# Patient Record
Sex: MALE | Race: OTHER | Employment: FULL TIME | URBAN - METROPOLITAN AREA
[De-identification: names, ages, dates, MRNs, and addresses within clinical notes are randomized per-mention and may not be internally consistent; named-entity substitution may affect disease eponyms.]

---

## 2018-06-13 ENCOUNTER — APPOINTMENT (OUTPATIENT)
Dept: GENERAL RADIOLOGY | Age: 23
End: 2018-06-13
Attending: NURSE PRACTITIONER
Payer: COMMERCIAL

## 2018-06-13 ENCOUNTER — HOSPITAL ENCOUNTER (EMERGENCY)
Age: 23
Discharge: HOME | End: 2018-06-13
Attending: EMERGENCY MEDICINE
Payer: COMMERCIAL

## 2018-06-13 VITALS
WEIGHT: 220 LBS | HEART RATE: 84 BPM | OXYGEN SATURATION: 99 % | BODY MASS INDEX: 28.23 KG/M2 | DIASTOLIC BLOOD PRESSURE: 73 MMHG | SYSTOLIC BLOOD PRESSURE: 145 MMHG | RESPIRATION RATE: 16 BRPM | HEIGHT: 74 IN | TEMPERATURE: 99 F

## 2018-06-13 DIAGNOSIS — R42 DIZZINESS: Primary | ICD-10-CM

## 2018-06-13 DIAGNOSIS — R07.89 CHEST PAIN, NON-CARDIAC: ICD-10-CM

## 2018-06-13 PROCEDURE — 93005 ELECTROCARDIOGRAM TRACING: CPT

## 2018-06-13 PROCEDURE — 80053 COMPREHEN METABOLIC PANEL: CPT | Performed by: NURSE PRACTITIONER

## 2018-06-13 PROCEDURE — 96360 HYDRATION IV INFUSION INIT: CPT

## 2018-06-13 PROCEDURE — 85025 COMPLETE CBC W/AUTO DIFF WBC: CPT | Performed by: NURSE PRACTITIONER

## 2018-06-13 PROCEDURE — 99285 EMERGENCY DEPT VISIT HI MDM: CPT

## 2018-06-13 PROCEDURE — 81003 URINALYSIS AUTO W/O SCOPE: CPT | Performed by: NURSE PRACTITIONER

## 2018-06-13 PROCEDURE — 71046 X-RAY EXAM CHEST 2 VIEWS: CPT | Performed by: NURSE PRACTITIONER

## 2018-06-13 PROCEDURE — 82962 GLUCOSE BLOOD TEST: CPT

## 2018-06-13 PROCEDURE — 84484 ASSAY OF TROPONIN QUANT: CPT | Performed by: EMERGENCY MEDICINE

## 2018-06-13 PROCEDURE — 85378 FIBRIN DEGRADE SEMIQUANT: CPT | Performed by: EMERGENCY MEDICINE

## 2018-06-13 PROCEDURE — 93010 ELECTROCARDIOGRAM REPORT: CPT

## 2018-06-13 NOTE — ED INITIAL ASSESSMENT (HPI)
Pt states since Monday feeling light headed and dizzy , today woke up with chest pain, , denies sob or nausea.  States sat/sun did ecstacy and etoh,states not eating to much due to right jaw pain from grinding teeth on sat

## 2018-06-13 NOTE — ED PROVIDER NOTES
[facility] In [place]    History   Patient presents with:  Dizziness (neurologic)    Stated Complaint: Dizziness    71-year-old male presents today with complaints of feeling of dizziness since Saturday.   Admits to us distress. HENT:   Head: Normocephalic. Nose: Nose normal.   Mouth/Throat: Uvula is midline, oropharynx is clear and moist and mucous membranes are normal.   Neck: Normal range of motion. Neck supple.    Cardiovascular: Normal rate, regular rhythm and no Final result                 Please view results for these tests on the individual orders. RAINBOW DRAW BLUE   RAINBOW DRAW LAVENDER   RAINBOW DRAW LIGHT GREEN     EKG    Rate, intervals and axes as noted on EKG Report.   Rate:90  Rhythm: Sinus Rhythm  Re

## 2021-08-13 ENCOUNTER — HOSPITAL ENCOUNTER (EMERGENCY)
Age: 26
Discharge: HOME | End: 2021-08-14
Attending: EMERGENCY MEDICINE
Payer: COMMERCIAL

## 2021-08-13 VITALS
WEIGHT: 225 LBS | RESPIRATION RATE: 16 BRPM | HEIGHT: 73 IN | SYSTOLIC BLOOD PRESSURE: 134 MMHG | OXYGEN SATURATION: 99 % | TEMPERATURE: 98 F | DIASTOLIC BLOOD PRESSURE: 66 MMHG | BODY MASS INDEX: 29.82 KG/M2 | HEART RATE: 72 BPM

## 2021-08-13 DIAGNOSIS — H61.21 IMPACTED CERUMEN OF RIGHT EAR: Primary | ICD-10-CM

## 2021-08-13 PROCEDURE — 99282 EMERGENCY DEPT VISIT SF MDM: CPT

## 2021-08-13 PROCEDURE — 69209 REMOVE IMPACTED EAR WAX UNI: CPT

## 2021-08-14 NOTE — ED PROVIDER NOTES
Patient Seen in: THE [facility] Emergency Department In [place]      History   Patient presents with:  Ear Problem Pain    Stated Complaint: Bilat ear pain and decreased hearing since Tuesday.     HPI/Subjective:   HPI    26-year-old male presents today for ri Eyes:      Extraocular Movements: Extraocular movements intact. Cardiovascular:      Rate and Rhythm: Normal rate. Pulmonary:      Effort: Pulmonary effort is normal.   Abdominal:      General: Abdomen is flat.    Musculoskeletal:         General: Nor

## 2024-08-19 ENCOUNTER — HOSPITAL ENCOUNTER (EMERGENCY)
Age: 29
Discharge: HOME | End: 2024-08-19
Attending: EMERGENCY MEDICINE
Payer: OTHER MISCELLANEOUS

## 2024-08-19 ENCOUNTER — APPOINTMENT (OUTPATIENT)
Dept: GENERAL RADIOLOGY | Age: 29
End: 2024-08-19
Payer: OTHER MISCELLANEOUS

## 2024-08-19 VITALS
RESPIRATION RATE: 16 BRPM | BODY MASS INDEX: 29 KG/M2 | DIASTOLIC BLOOD PRESSURE: 92 MMHG | TEMPERATURE: 99 F | HEART RATE: 74 BPM | SYSTOLIC BLOOD PRESSURE: 143 MMHG | WEIGHT: 222 LBS | OXYGEN SATURATION: 97 %

## 2024-08-19 DIAGNOSIS — S69.91XA FINGER INJURY, RIGHT, INITIAL ENCOUNTER: Primary | ICD-10-CM

## 2024-08-19 PROCEDURE — 99284 EMERGENCY DEPT VISIT MOD MDM: CPT

## 2024-08-19 PROCEDURE — 99283 EMERGENCY DEPT VISIT LOW MDM: CPT

## 2024-08-19 PROCEDURE — 73140 X-RAY EXAM OF FINGER(S): CPT

## 2024-08-19 RX ORDER — HYDROCODONE BITARTRATE AND ACETAMINOPHEN 5; 325 MG/1; MG/1
1-2 TABLET ORAL EVERY 6 HOURS PRN
Qty: 10 TABLET | Refills: 0 | Status: SHIPPED | OUTPATIENT
Start: 2024-08-19 | End: 2024-08-24

## 2024-08-19 RX ORDER — CEPHALEXIN 500 MG/1
500 CAPSULE ORAL 4 TIMES DAILY
Qty: 40 CAPSULE | Refills: 0 | Status: SHIPPED | OUTPATIENT
Start: 2024-08-19 | End: 2024-08-29

## 2024-08-20 NOTE — ED PROVIDER NOTES
Patient Seen in: [facility] In [place]      History     Chief Complaint   Patient presents with    Arm or Hand Injury     Stated Complaint: Got finger caught at work, whole nail came off. R 3rd digit    Subjective:   HPI    29-year-old male presenting emerged part for finger injury.  Patient was at work when his finger got caught got crushed symptoms the third digit with finger that was caught and crushed.  Fingernail came out at the cuticle prompting his visit here he denies any other complaints.    Objective:   History reviewed. No pertinent past medical history.           Past Surgical History:   Procedure Laterality Date    Other surgical history      cyst removal from heel, 12 years ago                Social History     Socioeconomic History    Marital status: Single   Tobacco Use    Smoking status: Never    Smokeless tobacco: Never    Tobacco comments:     marijuana   Vaping Use    Vaping status: Never Used   Substance and Sexual Activity    Alcohol use: Yes    Drug use: Yes     Types: Cannabis              Review of Systems    Positive for stated Chief Complaint: Arm or Hand Injury    Other systems are as noted in HPI.  Constitutional and vital signs reviewed.      All other systems reviewed and negative except as noted above.    Physical Exam     ED Triage Vitals [08/19/24 2123]   BP (!) 143/92   Pulse 74   Resp 16   Temp 98.7 °F (37.1 °C)   Temp src Temporal   SpO2 97 %   O2 Device None (Room air)       Current Vitals:   Vital Signs  BP: (!) 143/92  Pulse: 74  Resp: 16  Temp: 98.7 °F (37.1 °C)  Temp src: Temporal    Oxygen Therapy  SpO2: 97 %  O2 Device: None (Room air)            Physical Exam  Awake alert patient appears no distress HEENT exam normal lungs normal cardiovascular exam normal abdomen normal right hand third digit nail is removed from the cuticle on the third digit no active bleeding decree sensation of the fingertip no active bleeding no foreign bodies cap refill less  than 2 seconds no focal neurologic deficits       ED Course   Labs Reviewed - No data to display          Differential diagnosis includes fracture, dislocation         MDM                                         Medical Decision Making  29-year-old male with right third finger injury.  Independent interpretation by ED physician of x-ray shows no acute fracture.  Patient occlusion of metacarpal head block 3 cc of 1% lidocaine solution was instilled into the third metacarpal head region good anesthetic effect achieved.  The nail was placed back within the cuticle and a dressing was applied independent interpretation by ED physician x-ray shows no acute fractures patient was referred to hand surgery as well as Ko[facility] and is return emerged part worsening symptoms other complaints I did talk with patient about the potential for nail loss that is permanent.  The patient was screened and evaluated during this visit.  As a treating physician attending to the patient, I determined, within reasonable clinical confidence and prior to discharge, that an emergency medical condition was not or was no longer present.  There was no indication for further evaluation, treatment or admission on an emergency basis.    The usual and customary discharge instructions were discussed given the patient's ER course.  We discussed signs and symptoms that should prompt the patient's immediate return to the emergency department.  Reasonable over-the-counter and prescription treatment options and physician follow-up plan was discussed.  Patient was discharged home in good condition  This note was prepared using Dragon Medical voice recognition dictation software.  As a result errors may occur.  When identified to these areas have been corrected.  While every attempt is made to correct errors during dictation discrepancies may still exist.  Please contact if there are any errors    Problems Addressed:  Finger injury, right, initial  encounter: acute illness or injury    Amount and/or Complexity of Data Reviewed  Radiology: ordered and independent interpretation performed. Decision-making details documented in ED Course.  ECG/medicine tests: ordered and independent interpretation performed. Decision-making details documented in ED Course.        Disposition and Plan     Clinical Impression:  1. Finger injury, right, initial encounter         Disposition:  Discharge  8/19/2024 10:58 pm    Follow-up:  Jeff Doe MD  [address] [facility] [facility] [address]  [address]  [place] [address]  [phone]    Follow up in 1 week(s)      Wilver Henderson MD  [address] [facility] [address] [facility]  [address]  [facility] [address]  [phone]    Follow up in 1 day(s)            Medications Prescribed:  Current Discharge Medication List        START taking these medications    Details   HYDROcodone-acetaminophen 5-325 MG Oral Tab Take 1-2 tablets by mouth every 6 (six) hours as needed.  Qty: 10 tablet, Refills: 0    Associated Diagnoses: Finger injury, right, initial encounter      cephalexin 500 MG Oral Cap Take 1 capsule (500 mg total) by mouth 4 (four) times daily for 10 days.  Qty: 40 capsule, Refills: 0    Associated Diagnoses: Finger injury, right, initial encounter

## 2025-04-29 ENCOUNTER — HOSPITAL ENCOUNTER (OUTPATIENT)
Age: 30
Discharge: HOME | End: 2025-04-29
Attending: EMERGENCY MEDICINE
Payer: COMMERCIAL

## 2025-04-29 VITALS
DIASTOLIC BLOOD PRESSURE: 82 MMHG | RESPIRATION RATE: 18 BRPM | HEART RATE: 91 BPM | BODY MASS INDEX: 33 KG/M2 | SYSTOLIC BLOOD PRESSURE: 134 MMHG | OXYGEN SATURATION: 97 % | TEMPERATURE: 98 F | WEIGHT: 250 LBS

## 2025-04-29 DIAGNOSIS — L12.0 BULLOUS PEMPHIGOID (HCC): Primary | ICD-10-CM

## 2025-04-29 PROCEDURE — 99213 OFFICE O/P EST LOW 20 MIN: CPT

## 2025-04-29 RX ORDER — CLOBETASOL PROPIONATE 0.5 MG/G
1 CREAM TOPICAL 2 TIMES DAILY
Qty: 60 G | Refills: 1 | Status: SHIPPED | OUTPATIENT
Start: 2025-04-29 | End: 2025-05-13

## 2025-04-29 RX ORDER — DOXYCYCLINE 100 MG/1
100 CAPSULE ORAL 2 TIMES DAILY
Qty: 14 CAPSULE | Refills: 0 | Status: SHIPPED | OUTPATIENT
Start: 2025-04-29 | End: 2025-05-06

## 2025-04-29 NOTE — DISCHARGE INSTRUCTIONS
Use the topical clobetasol cream twice daily for 14 days and take the antibiotics as prescribed follow-up with dermatology if you have worsening symptoms get fever differently breathing vomiting or if you notice sores in your mouth around your eyes you should go to the emergency department.

## 2025-04-29 NOTE — ED INITIAL ASSESSMENT (HPI)
Pt presents with scaly rash to bilat legs, abdomen and arms starting with a blister 1 week ago, itchy at times

## 2025-05-02 NOTE — ED PROVIDER NOTES
Patient Seen in: [facility]      History   No chief complaint on file.    Stated Complaint: Rash    Subjective:   HPI    30-year-old male complaining of rash patient has rash that started about a week ago was blistering and and blisters ruptured was mildly uncomfortable mildly itchy noted it thighs bilaterally somewhat on the abdomen and on the left arm as well.  No fever did have mild sore throat couple weeks ago.  Denies any other previous dermatologic problems no difficulty breathing.  Denies any problems with his eyes or his oropharynx.  History of Present Illness               Objective:     History reviewed. No pertinent past medical history.           Past Surgical History:   Procedure Laterality Date    Other surgical history      cyst removal from heel, 12 years ago                Social History     Socioeconomic History    Marital status: Single   Tobacco Use    Smoking status: Never    Smokeless tobacco: Never    Tobacco comments:     marijuana   Vaping Use    Vaping status: Never Used   Substance and Sexual Activity    Alcohol use: Yes    Drug use: Yes     Types: Cannabis              Review of Systems    Positive for stated complaint: Rash  Other systems are as noted in HPI.  Constitutional and vital signs reviewed.      All other systems reviewed and negative except as noted above.                  Physical Exam     ED Triage Vitals [04/29/25 1748]   /82   Pulse 91   Resp 18   Temp 98.3 °F (36.8 °C)   Temp src Oral   SpO2 97 %   O2 Device None (Room air)       Current Vitals:   No data recorded    Physical Exam  Patient is alert orient x 3 in no acute distress the oropharynx is clear HEENT exam within normal limits eyes are normal conjunctiva appear normal neck supple's no Noguchi rigidity or lymphadenopathy lungs are clear cardiovascular exam shows regular rate and rhythm without murmurs.  Skin there is some areas of macular patchy rash on the medial aspect of the left thigh  and slightly on the corresponding area on the right thigh and somewhat on the left arm and slightly on the abdomen as well these appear to be more consistent with ruptured vesicles there is 1 small vesicle is intact as [place].  The underlying area is mildly erythematous this does not extend past the border is somewhat scaly and dry it is not raised.  Does not appear to be petechial or purpura.  Physical Exam                ED Course   Labs Reviewed - No data to display       Results                                 MDM      Initial differential diagnosis considered but not limited to includes bullous pemphigoid, bullous impetigo, but not limited to these        Medical Decision Making    Patient appears well there is no mucous membrane involvement he was given a prescription for doxycycline and a steroid cream advised follow-up with dermatology return if worse.  Disposition and Plan     Clinical Impression:  1. Bullous pemphigoid (HCC)         Disposition:  Discharge  4/29/2025  6:34 pm    Follow-up:  [facility]  [address] [place] [id number]  In 1 week            Medications Prescribed:  Discharge Medication List as of 4/29/2025  6:47 PM        START taking these medications    Details   clobetasol 0.05 % External Cream Apply 1 Application topically 2 (two) times daily for 14 days., Normal, Disp-60 g, R-1      doxycycline 100 MG Oral Cap Take 1 capsule (100 mg total) by mouth 2 (two) times daily for 7 days., Normal, Disp-14 capsule, R-0             Supplementary Documentation:

## 2025-07-15 ENCOUNTER — HOSPITAL ENCOUNTER (EMERGENCY)
Age: 30
Discharge: HOME | End: 2025-07-15
Payer: COMMERCIAL

## 2025-07-15 ENCOUNTER — APPOINTMENT (OUTPATIENT)
Dept: GENERAL RADIOLOGY | Age: 30
End: 2025-07-15
Attending: NURSE PRACTITIONER
Payer: COMMERCIAL

## 2025-07-15 VITALS
RESPIRATION RATE: 16 BRPM | SYSTOLIC BLOOD PRESSURE: 144 MMHG | WEIGHT: 240 LBS | BODY MASS INDEX: 31.81 KG/M2 | HEART RATE: 83 BPM | TEMPERATURE: 99 F | HEIGHT: 73 IN | DIASTOLIC BLOOD PRESSURE: 82 MMHG | OXYGEN SATURATION: 97 %

## 2025-07-15 DIAGNOSIS — S83.92XA SPRAIN OF LEFT KNEE, UNSPECIFIED LIGAMENT, INITIAL ENCOUNTER: Primary | ICD-10-CM

## 2025-07-15 PROCEDURE — 73562 X-RAY EXAM OF KNEE 3: CPT | Performed by: NURSE PRACTITIONER

## 2025-07-15 PROCEDURE — 99283 EMERGENCY DEPT VISIT LOW MDM: CPT

## 2025-07-15 PROCEDURE — 99284 EMERGENCY DEPT VISIT MOD MDM: CPT

## 2025-07-15 NOTE — DISCHARGE INSTRUCTIONS
Over-the-counter Tylenol and/or ibuprofen as needed for pain.  Use a knee sleeve or the like for comfort.  Ice and elevate for about 10 minutes after activities.  Call the orthopedist for further evaluation.

## 2025-07-15 NOTE — ED PROVIDER NOTES
Patient Seen in: [facility] In [place]        History  Chief Complaint   Patient presents with    Knee Pain     Stated Complaint: left knee pain    Subjective:   30-year-old male here for evaluation of left knee pain.  He told me that about 2 weeks ago he was at "[employer] was hiking.  States he slipped and landed directly onto his left knee.  Has been having pain to the inside of his left knee since.  States that he works for [employer], and is on his feet all the time.                      Objective:     History reviewed. No pertinent past medical history.           Past Surgical History:   Procedure Laterality Date    Other surgical history      cyst removal from heel, 12 years ago                Social History     Socioeconomic History    Marital status: Single   Tobacco Use    Smoking status: Never    Smokeless tobacco: Never    Tobacco comments:     marijuana   Vaping Use    Vaping status: Never Used   Substance and Sexual Activity    Alcohol use: Yes    Drug use: Yes     Types: Cannabis                                Physical Exam    ED Triage Vitals [07/15/25 0814]   /82   Pulse 83   Resp 16   Temp 99.3 °F (37.4 °C)   Temp src Oral   SpO2 97 %   O2 Device None (Room air)       Current Vitals:   Vital Signs  BP: 144/82  Pulse: 83  Resp: 16  Temp: 99.3 °F (37.4 °C)  Temp src: Oral    Oxygen Therapy  SpO2: 97 %  O2 Device: None (Room air)            Physical Exam  Vitals and nursing note reviewed.   Constitutional:       General: He is not in acute distress.     Appearance: Normal appearance. He is not ill-appearing, toxic-appearing or diaphoretic.   Pulmonary:      Effort: No respiratory distress.   Musculoskeletal:      Left knee: No swelling, deformity, ecchymosis, lacerations or bony tenderness. Normal range of motion. Tenderness present over the MCL. No medial joint line or patellar tendon tenderness. Normal pulse.   Skin:     Capillary Refill: Capillary refill takes less  than 2 seconds.   Neurological:      Mental Status: He is alert and oriented to person, place, and time.   Psychiatric:         Behavior: Behavior normal.                 ED Course  Labs Reviewed - No data to display  XR KNEE (3 VIEWS), LEFT (CPT=73562)  Result Date: 7/15/2025  PROCEDURE: XR KNEE (3 VIEWS), LEFT (CPT=73562) INDICATIONS: left knee pain PATIENT STATED HISTORY: About two weeks ago, patient was hiking and fell, landing on his left knee hitting a rock.  Since then, he complains of pain near the medial aspect of the lower patella.  The pain occurs with flexion of the knee, when walking for longer period of time and when standing up from a seated position. COMPARISON: There are no comparisons for this exam.     CONCLUSION: No acute fracture or traumatic malalignment. Patella cuong, similar to 2011. No patellar subluxation. Joint spaces are preserved. No significant joint effusion. Mild infrapatellar soft tissue swelling. If there is clinical suspicion of internal derangement of the knee, recommend MRI for further assessment. Electronically Verified and Signed by Attending Radiologist: Favian Montoya MD 7/15/2025 9:03 AM Workstation: [id number]                           [facility]             Medical Decision Making  Differential diagnosis initially included but was not limited to: Knee sprain, knee contusion, dislocation, fracture    Nontoxic adult male patient with left knee injury from a couple weeks ago, pain to the medial aspect.  No obvious swelling or deformity.  No bruising.  Range of motion is intact although with some pain.  No vascular deficits.  Will obtain x-ray.    I personally viewed, independently interpreted and radiology report was reviewed.  No fracture, dislocation.  I suspect sprain.    Supportive/home management of diagnosis/illness/injury discussed.  Patient and/or responsible adult verbalize and agree with management and plan of care.    Speech recognition software was used during this  dictation.  There may be minor errors in transcription.          Amount and/or Complexity of Data Reviewed  Radiology: ordered and independent interpretation performed. Decision-making details documented in ED Course.        Disposition and Plan     Clinical Impression:  1. Sprain of left knee, unspecified ligament, initial encounter         Disposition:  Discharge  7/15/2025  9:14 am    Follow-up:  Diego Solis MD  [address] [place] [address], [facility] [address]  [phone]    Schedule an appointment as soon as possible for a visit            Medications Prescribed:  Current Discharge Medication List                Supplementary Documentation:

## 2025-07-17 ENCOUNTER — OFFICE VISIT (OUTPATIENT)
Facility: CLINIC | Age: 30
End: 2025-07-17
Payer: COMMERCIAL

## 2025-07-17 VITALS — HEIGHT: 73 IN | WEIGHT: 240 LBS | BODY MASS INDEX: 31.81 KG/M2

## 2025-07-17 DIAGNOSIS — S80.02XA CONTUSION OF LEFT KNEE, INITIAL ENCOUNTER: Primary | ICD-10-CM

## 2025-07-17 PROCEDURE — 3008F BODY MASS INDEX DOCD: CPT | Performed by: STUDENT IN AN ORGANIZED HEALTH CARE EDUCATION/TRAINING PROGRAM

## 2025-07-17 PROCEDURE — 99203 OFFICE O/P NEW LOW 30 MIN: CPT | Performed by: STUDENT IN AN ORGANIZED HEALTH CARE EDUCATION/TRAINING PROGRAM

## 2025-07-17 RX ORDER — MELOXICAM 15 MG/1
15 TABLET ORAL DAILY
Qty: 30 TABLET | Refills: 0 | Status: SHIPPED | OUTPATIENT
Start: 2025-07-17 | End: 2025-08-16

## 2025-07-17 NOTE — PROGRESS NOTES
Orthopaedic Surgery  [address] [address] [address]   [phone]      Chief Complaint:  Left Knee Pain    History of Present Illness  Surinder Patton is a 30 year old male who presents with left knee pain.    He has been experiencing right knee pain for the past three weeks after slipping while playing beach volleyball. Initially, he felt a minor tweak during the game, but discomfort arose the following day. The pain intensified after a hiking trip at [place] in the [place], where he slipped and fell on his knee onto a rock. He was able to complete hiking. Since returning from his trip, he has been at work with a busy schedule. He works as an  at [employer], which involves significant movement and physical activity. The pain worsened during a busy work week with [employer], particularly on a recent Monday, when he worked over 60 hours.     The pain is primarily on the medial aspect of the knee and sometimes radiates down the leg while walking. He describes the pain as uncomfortable and has difficulty putting pressure on the knee.    The knee feels swollen, although it does not appear significantly swollen to him. He has not been using any medications for the knee pain.    He has not taken any anti-inflammatory medications or other treatments for the knee pain.    He recalls a previous knee injury in high school while playing backyard football, which required temporary use of crutches but no surgery and he recovered fully. He does not note hearing a pop, twist, or tear during the initial injury while playing volleyball.    He does not require any assistive devices for ambulation.      PMH/PSH/Family History/Social History/Meds/Allergies:   Past Medical History[1]     Past Surgical History[2]     Family History[3]     Social History     Socioeconomic History    Marital status: Single     Spouse name: Not on file    Number of children: Not on file    Years of education: Not  on file    Highest education level: Not on file   Occupational History    Not on file   Tobacco Use    Smoking status: Never    Smokeless tobacco: Never    Tobacco comments:     marijuana   Vaping Use    Vaping status: Never Used   Substance and Sexual Activity    Alcohol use: Yes    Drug use: Yes     Types: Cannabis    Sexual activity: Not on file   Other Topics Concern    Not on file   Social History Narrative    Not on file     Social Drivers of Health     Food Insecurity: Not on file   Transportation Needs: Not on file   Stress: Not on file   Housing Stability: Not on file        Current Outpatient Medications   Medication Instructions    Meloxicam 15 mg, Oral, Daily        Allergies[4]       Physical Exam:   Vitals:    07/17/25 0953   Weight: 240 lb (108.9 kg)   Height: 6' 1\" (1.854 m)     Estimated body mass index is 31.66 kg/m² as calculated from the following:    Height as of this encounter: 6' 1\" (1.854 m).    Weight as of this encounter: 240 lb (108.9 kg).    Constitutional: No acute distress, well nourished  Eyes: Anicteric sclera, pink conjunctiva  Ears, Nose, Mouth and Throat: Normocephalic, atraumatic, moist mucous membranes  Cardiovascular: No pitting edema or varicosities in the lower extremities  Respiratory: No respiratory distress, normal respiratory rhythm and effort   Neurological:  Oriented to person, place, and time  Psychological:  Appropriate mood and affect    Comprehensive Left Knee Exam:      Inspection: No erythema, ecchymoses, or wounds. No rash. No previous incisions noted. Mild effusion. No quad atrophy  Alignment: neutral  ROM: 0 - 120 degrees, flexion contracture: 0 degrees, quad lag: no  Stability: A/P stress: stable, firm endpoint, M/L stress: stable, firm endpoint  Pain or crepitus with ROM?: Some discomfort  Tenderness to palpation at: medial parapatellar retinacular tissues. Non-tender at: medial joint line, lateral joint line, patella, patellar tendon, quadriceps  tendon  Strength: Intact 5/5 strength SLR and TA/GS/FHL/EHL  Sensation: Grossly intact to light touch over SPN/DPN/Saph/Sural/Tibial nerve distributions  Vasc: Warm perfused extremity        Imaging:   Weightbearing XRs of the left knee were obtained with AP, PA Flex, sunrise, and lateral views    They are no significant degenerative changes of the knee. No fracture or dislocation seen. There is patella cuong which appears to be baseline and is clinically present at the other knee as well    I personally reviewed and interpreted the radiographs.      Assessment:     ICD-10-CM    1. Contusion of left knee, initial encounter  S80.02XA              Plan:   Patient is a 30-year-old male presents with left knee pain  He has had a series of events contributing to his symptoms including an injury while playing beach volleyball, a direct fall onto the front of his knee, and having a busy schedule at a physical job, limiting his ability to rest the knee and allow it to heal    He does have a few days off work so I recommend that he rest the knee. He should ice for swelling. A prescription was provided today for meloxicam which he may take over the next 2 to 4 weeks to help with inflammation    If no improvement over the next 4 weeks, we can consider obtaining an MRI of the knee but I anticipate he will see some improvement with the above course of treatment      Thank you very much for allowing me to participate in the care of this patient. If you have any questions, please do not hesitate to contact me.      Diego Solis MD  Adult Hip and Knee Reconstruction    Department of Orthopaedic Surgery  [facility]     [address] [place] [id number]  [address] [place] [address]     t: [phone]  f: [phone]       [facility]  The following individual(s) verbally consented to be recorded using ambient AI listening technology and understand that they can each withdraw their consent to this  listening technology at any point by asking the clinician to turn off or pause the recording:    Patient name: Surinder Patton  Additional names: April Tse tool was used for dictation purposes only and the patient was not recorded at any point during the visit.         [1] History reviewed. No pertinent past medical history.  [2]   Past Surgical History:  Procedure Laterality Date    Other surgical history      cyst removal from heel, 12 years ago   [3]   Family History  Problem Relation Age of Onset    Cancer Maternal Grandmother     Stroke Maternal Grandfather     Stroke Paternal Grandfather    [4] No Known Allergies

## (undated) NOTE — LETTER
Date & Time: 7/15/2025, 9:14 AM  Patient: Surinder Patton  Encounter Provider(s):    Nile Moran APRN       To Whom It May Concern:    Surinder Patton was seen and treated in our department on 7/15/2025. He should not return to work until July 17.    If you have any questions or concerns, please do not hesitate to call.        _____________________________  Physician/APC Signature

## (undated) NOTE — ED AVS SNAPSHOT
Dragan Hernandez   MRN: [id number]    Department:  [facility] Emergency Department in [place]   Date of Visit:  6/13/2018           Disclosure     Insurance plans vary and the physician(s) referred by the ER may not be covered by your plan.  Please contact y tell this physician (or your personal doctor if your instructions are to return to your personal doctor) about any new or lasting problems. The primary care or specialist physician will see patients referred from the [facility] Emergency Department.  Wing Quiroz